# Patient Record
(demographics unavailable — no encounter records)

---

## 2024-12-27 NOTE — HISTORY OF PRESENT ILLNESS
[Patient reported mammogram was abnormal] : Patient reported mammogram was abnormal [Patient reported breast sonogram was normal] : Patient reported breast sonogram was normal [Patient reported PAP Smear was normal] : Patient reported PAP Smear was normal [FreeTextEntry1] : 42 y/o  here for annual.  Had melanoma removed from left upper arm/deltoid ~2 months ago.   x 2, SAB x 1  just got a vasectomy.  LMP , normal/regular.  h/o birads 3 mammo - 2023, had 6m f/up 2024 (diagnostic) - birads 2, resume normal screening (due this month).  [Mammogramdate] : 12/23 [TextBox_19] : Birads 3, repeated 7/2024 birads 2 [BreastSonogramDate] : 12/23 [PapSmeardate] : 03/21

## 2024-12-27 NOTE — PHYSICAL EXAM
[Chaperone Present] : A chaperone was present in the examining room during all aspects of the physical examination [19228] : A chaperone was present during the pelvic exam. [Appropriately responsive] : appropriately responsive [Alert] : alert [No Acute Distress] : no acute distress [Soft] : soft [Non-tender] : non-tender [Non-distended] : non-distended [Oriented x3] : oriented x3 [Examination Of The Breasts] : a normal appearance [No Masses] : no breast masses were palpable [Labia Majora] : normal [Labia Minora] : normal [Normal] : normal [Uterine Adnexae] : non-palpable [FreeTextEntry2] : Amber